# Patient Record
Sex: MALE | Race: WHITE
[De-identification: names, ages, dates, MRNs, and addresses within clinical notes are randomized per-mention and may not be internally consistent; named-entity substitution may affect disease eponyms.]

---

## 2017-06-13 ENCOUNTER — HOSPITAL ENCOUNTER (EMERGENCY)
Dept: HOSPITAL 54 - ER | Age: 37
Discharge: HOME | End: 2017-06-13
Payer: COMMERCIAL

## 2017-06-13 VITALS — HEIGHT: 68 IN | BODY MASS INDEX: 34.1 KG/M2 | WEIGHT: 225 LBS

## 2017-06-13 VITALS — DIASTOLIC BLOOD PRESSURE: 84 MMHG | SYSTOLIC BLOOD PRESSURE: 138 MMHG

## 2017-06-13 DIAGNOSIS — I10: ICD-10-CM

## 2017-06-13 DIAGNOSIS — R11.0: ICD-10-CM

## 2017-06-13 DIAGNOSIS — K12.2: ICD-10-CM

## 2017-06-13 DIAGNOSIS — Z88.5: ICD-10-CM

## 2017-06-13 DIAGNOSIS — E66.9: ICD-10-CM

## 2017-06-13 DIAGNOSIS — J02.9: ICD-10-CM

## 2017-06-13 DIAGNOSIS — E86.0: ICD-10-CM

## 2017-06-13 DIAGNOSIS — E11.65: ICD-10-CM

## 2017-06-13 DIAGNOSIS — K21.9: ICD-10-CM

## 2017-06-13 DIAGNOSIS — K59.00: Primary | ICD-10-CM

## 2017-06-13 LAB
ALBUMIN SERPL BCP-MCNC: 3.6 G/DL (ref 3.4–5)
ALP SERPL-CCNC: 91 U/L (ref 46–116)
ALT SERPL W P-5'-P-CCNC: 40 U/L (ref 12–78)
APTT PPP: 23 SEC (ref 23–34)
AST SERPL W P-5'-P-CCNC: 24 U/L (ref 15–37)
BASOPHILS # BLD AUTO: 0 /CMM (ref 0–0.2)
BASOPHILS NFR BLD AUTO: 0.4 % (ref 0–2)
BILIRUB DIRECT SERPL-MCNC: 0.1 MG/DL (ref 0–0.2)
BILIRUB SERPL-MCNC: 0.6 MG/DL (ref 0.2–1)
BUN SERPL-MCNC: 14 MG/DL (ref 7–18)
CALCIUM SERPL-MCNC: 9.4 MG/DL (ref 8.5–10.1)
CHLORIDE SERPL-SCNC: 101 MMOL/L (ref 98–107)
CO2 SERPL-SCNC: 31 MMOL/L (ref 21–32)
CREAT SERPL-MCNC: 0.7 MG/DL (ref 0.6–1.3)
EOSINOPHIL # BLD AUTO: 0.2 /CMM (ref 0–0.7)
EOSINOPHIL NFR BLD AUTO: 1.9 % (ref 0–6)
GLUCOSE SERPL-MCNC: 248 MG/DL (ref 74–106)
HCT VFR BLD AUTO: 48 % (ref 39–51)
HGB BLD-MCNC: 15.8 G/DL (ref 13.5–17.5)
INR PPP: 0.99 (ref 0.87–1.13)
LYMPHOCYTES NFR BLD AUTO: 2.3 /CMM (ref 0.8–4.8)
LYMPHOCYTES NFR BLD AUTO: 23.4 % (ref 20–44)
MCH RBC QN AUTO: 28 PG (ref 26–33)
MCHC RBC AUTO-ENTMCNC: 33 G/DL (ref 31–36)
MCV RBC AUTO: 84 FL (ref 80–96)
MONOCYTES NFR BLD AUTO: 0.5 /CMM (ref 0.1–1.3)
MONOCYTES NFR BLD AUTO: 5.1 % (ref 2–12)
NEUTROPHILS # BLD AUTO: 6.7 /CMM (ref 1.8–8.9)
NEUTROPHILS NFR BLD AUTO: 69.2 % (ref 43–81)
PLATELET # BLD AUTO: 201 /CMM (ref 150–450)
POTASSIUM SERPL-SCNC: 4.4 MMOL/L (ref 3.5–5.1)
PROT SERPL-MCNC: 7.7 G/DL (ref 6.4–8.2)
PROTHROMBIN TIME: 10.3 SECS (ref 9.5–12.7)
RBC # BLD AUTO: 5.71 MIL/UL (ref 4.5–6)
RDW COEFFICIENT OF VARIATION: 12.1 (ref 11.5–15)
SODIUM SERPL-SCNC: 139 MMOL/L (ref 136–145)
TROPONIN I SERPL-MCNC: < 0.017 NG/ML (ref 0–0.06)
WBC NRBC COR # BLD AUTO: 9.7 K/UL (ref 4.3–11)

## 2017-06-13 PROCEDURE — Z7610: HCPCS

## 2017-06-13 PROCEDURE — A4606 OXYGEN PROBE USED W OXIMETER: HCPCS

## 2017-06-13 NOTE — NUR
PT came in for multiple complaints of productive cough, congestion, epigastric 
pain, sob x 1.5 wk. NAD NOTED. VSS. Seen by MD for eval. Denies fever. Safety 
and comfort measures provided. Will monitor.

## 2019-07-26 ENCOUNTER — HOSPITAL ENCOUNTER (EMERGENCY)
Dept: HOSPITAL 54 - ER | Age: 39
Discharge: HOME | End: 2019-07-26
Payer: COMMERCIAL

## 2019-07-26 VITALS — DIASTOLIC BLOOD PRESSURE: 86 MMHG | SYSTOLIC BLOOD PRESSURE: 146 MMHG

## 2019-07-26 VITALS — WEIGHT: 260 LBS | BODY MASS INDEX: 38.51 KG/M2 | HEIGHT: 69 IN

## 2019-07-26 DIAGNOSIS — Z79.84: ICD-10-CM

## 2019-07-26 DIAGNOSIS — E78.5: ICD-10-CM

## 2019-07-26 DIAGNOSIS — J20.9: Primary | ICD-10-CM

## 2019-07-26 DIAGNOSIS — K21.9: ICD-10-CM

## 2019-07-26 DIAGNOSIS — Z79.899: ICD-10-CM

## 2019-07-26 DIAGNOSIS — F32.9: ICD-10-CM

## 2019-07-26 DIAGNOSIS — I10: ICD-10-CM

## 2019-07-26 DIAGNOSIS — F41.9: ICD-10-CM

## 2019-07-26 DIAGNOSIS — E11.9: ICD-10-CM

## 2019-07-26 PROCEDURE — 71045 X-RAY EXAM CHEST 1 VIEW: CPT

## 2019-07-26 PROCEDURE — 99283 EMERGENCY DEPT VISIT LOW MDM: CPT

## 2019-07-26 PROCEDURE — 96372 THER/PROPH/DIAG INJ SC/IM: CPT
